# Patient Record
Sex: MALE | Race: BLACK OR AFRICAN AMERICAN | Employment: FULL TIME | URBAN - METROPOLITAN AREA
[De-identification: names, ages, dates, MRNs, and addresses within clinical notes are randomized per-mention and may not be internally consistent; named-entity substitution may affect disease eponyms.]

---

## 2022-10-30 ENCOUNTER — HOSPITAL ENCOUNTER (EMERGENCY)
Age: 32
Discharge: HOME | End: 2022-10-30

## 2022-10-30 VITALS
SYSTOLIC BLOOD PRESSURE: 126 MMHG | BODY MASS INDEX: 26.48 KG/M2 | RESPIRATION RATE: 17 BRPM | TEMPERATURE: 97.5 F | HEIGHT: 65 IN | WEIGHT: 158.95 LBS | DIASTOLIC BLOOD PRESSURE: 73 MMHG | HEART RATE: 64 BPM | OXYGEN SATURATION: 100 %

## 2022-10-30 DIAGNOSIS — R52 BODY ACHES: ICD-10-CM

## 2022-10-30 DIAGNOSIS — B34.9 VIRAL ILLNESS: Primary | ICD-10-CM

## 2022-10-30 DIAGNOSIS — Z20.822 LAB TEST NEGATIVE FOR COVID-19 VIRUS: ICD-10-CM

## 2022-10-30 DIAGNOSIS — Z02.89 ENCOUNTER TO OBTAIN EXCUSE FROM WORK: ICD-10-CM

## 2022-10-30 LAB
RAPID INFLUENZA  B AGN: NEGATIVE
RAPID INFLUENZA A AGN: NEGATIVE
SARS-COV-2, NAAT: NOT DETECTED

## 2022-10-30 PROCEDURE — 87804 INFLUENZA ASSAY W/OPTIC: CPT

## 2022-10-30 PROCEDURE — 99283 EMERGENCY DEPT VISIT LOW MDM: CPT

## 2022-10-30 PROCEDURE — 6370000000 HC RX 637 (ALT 250 FOR IP): Performed by: NURSE PRACTITIONER

## 2022-10-30 PROCEDURE — 87635 SARS-COV-2 COVID-19 AMP PRB: CPT

## 2022-10-30 RX ORDER — ACETAMINOPHEN 500 MG
1000 TABLET ORAL ONCE
Status: COMPLETED | OUTPATIENT
Start: 2022-10-30 | End: 2022-10-30

## 2022-10-30 RX ORDER — ACETAMINOPHEN 500 MG
500 TABLET ORAL 4 TIMES DAILY PRN
Qty: 28 TABLET | Refills: 0 | Status: SHIPPED | OUTPATIENT
Start: 2022-10-30 | End: 2022-11-06

## 2022-10-30 RX ORDER — IBUPROFEN 600 MG/1
600 TABLET ORAL 3 TIMES DAILY PRN
Qty: 15 TABLET | Refills: 0 | Status: SHIPPED | OUTPATIENT
Start: 2022-10-30 | End: 2022-11-04

## 2022-10-30 RX ADMIN — ACETAMINOPHEN 1000 MG: 500 TABLET ORAL at 14:45

## 2022-10-30 RX ADMIN — IBUPROFEN 600 MG: 200 TABLET, FILM COATED ORAL at 14:46

## 2022-10-30 ASSESSMENT — PAIN SCALES - GENERAL
PAINLEVEL_OUTOF10: 5
PAINLEVEL_OUTOF10: 5

## 2022-10-30 ASSESSMENT — PAIN - FUNCTIONAL ASSESSMENT: PAIN_FUNCTIONAL_ASSESSMENT: 0-10

## 2022-10-30 NOTE — ED NOTES
D/C: Order noted for d/c. Pt confirmed d/c paperwork  have correct name. Discharge and education instructions reviewed with patient. Teach-back successful. Pt verbalized understanding and signed d/c papers. Pt denied questions at this time. No acute distress noted. Patient instructed to follow-up as noted - return to emergency department if symptoms worsen. Patient verbalized understanding. Discharged per EDMD with discharge instructions. Pt discharged to private vehicle. Patient stable upon departure. Thanked patient for choosing [id number]  for care. Provider aware of patient pain at time of discharge.        Madelyn Zarate RN  10/30/22 9603

## 2022-10-30 NOTE — DISCHARGE INSTRUCTIONS
Medication as prescribed ensuring that you eat prior to taking the ibuprofen as this is an NSAID medication that can otherwise cause gastrointestinal bleeding/stomach ulcers if taken on empty stomach. Drink plenty of fluids. Get plenty of rest.    Return to work tomorrow.

## 2022-10-30 NOTE — Clinical Note
Jose C Scott was seen and treated in our emergency department on 10/30/2022. He may return to work on 10/31/2022. If you have any questions or concerns, please don't hesitate to call.       Reina Alatorre, CEASAR - CNP

## 2022-10-30 NOTE — Clinical Note
Antwan Hamilton was seen and treated in our emergency department on 10/30/2022. He may return to work on 10/31/2022. If you have any questions or concerns, please don't hesitate to call.       Chuck Leonard, CEASAR - CNP

## 2022-11-02 ASSESSMENT — ENCOUNTER SYMPTOMS
VOMITING: 0
ABDOMINAL PAIN: 0
COUGH: 0
EYE PAIN: 0
SHORTNESS OF BREATH: 0
BACK PAIN: 0
ANAL BLEEDING: 0
NAUSEA: 0
SORE THROAT: 0

## 2022-11-02 NOTE — ED PROVIDER NOTES
[address]  [address] [address]  Dept: [phone]  Loc: [address] ENCOUNTER    GUILLERMO. I have evaluated this patient. My supervising physician was available for consultation. CHIEF COMPLAINT    Chief Complaint   Patient presents with    Generalized Body Aches     Pt states he has body aches for a day. HPI    Shakeel Batista is a 28 y.o. nontoxic, well-appearing male who presents with concern for possible COVID-19 infection. Symptoms include generalized body \"aches\" rated severity of 5/10. Onset was 1 day ago. Denies nasal/chest congestion, cough, chest pain, nausea, vomiting, lightheadedness, dizziness, presyncope, shortness of breath, fever, chills, sweats, cough, change in ability to smell/taste, headache, sore throat, otalgia, neck pain/stiffness, rashes, or other symptoms/concerns. The duration has been constant since the onset. There are no alleviating factors. The patient has not been COVID-19 vaccinated and boosted. He did get a flu shot this year. He has not had recent sick contacts. REVIEW OF SYSTEMS    Review of Systems   Constitutional:  Negative for chills, diaphoresis, fatigue and fever. HENT:  Negative for congestion and sore throat. Eyes:  Negative for pain and visual disturbance. Respiratory:  Negative for cough and shortness of breath. Cardiovascular:  Negative for chest pain and leg swelling. Gastrointestinal:  Negative for abdominal pain, anal bleeding, nausea and vomiting. Genitourinary:  Negative for difficulty urinating, dysuria, frequency and urgency. Musculoskeletal:  Positive for myalgias. Negative for back pain, neck pain and neck stiffness. Skin:  Negative for rash and wound. Neurological:  Negative for dizziness and light-headedness. Hematological: Negative. Psychiatric/Behavioral: Negative.          PAST MEDICAL AND SURGICAL Addended by: CRISTIANA ROMERO on: 3/10/2017 12:10 PM     Modules accepted: Orders     HISTORY    No past medical history on file. No past surgical history on file. CURRENT MEDICATIONS  (may include discharge medications prescribed in the ED)  Current Outpatient Rx   Medication Sig Dispense Refill    ibuprofen (ADVIL;MOTRIN) 600 MG tablet Take 1 tablet by mouth 3 times daily as needed for Pain With meals 15 tablet 0    acetaminophen (TYLENOL) 500 MG tablet Take 1 tablet by mouth 4 times daily as needed for Pain 28 tablet 0       ALLERGIES    No Known Allergies    FAMILY AND SOCIAL HISTORY    No family history on file. Social History     Socioeconomic History    Marital status: Single   Tobacco Use    Smoking status: Never    Smokeless tobacco: Never   Substance and Sexual Activity    Alcohol use: No    Drug use: No       PHYSICAL EXAM    VITAL SIGNS: /73   Pulse 64   Temp 97.5 °F (36.4 °C) (Oral)   Resp 17   Ht 5' 5\" (1.651 m)   Wt 158 lb 15.2 oz (72.1 kg)   SpO2 100%   BMI 26.45 kg/m²   Constitutional:  Well developed, well nourished, no acute distress  Eyes: Sclera nonicteric, conjunctiva normal   Ears: external ears normal, TM's clear bilaterally  Throat/Face: Uvula is midline. No erythema. No exudate or edema of the throat, no trismus, moist mucus membranes  Neck: Supple, no enlarged tonsillar lymphadenopathy  Respiratory:  Lungs clear to auscultation bilaterally, no retractions, no wheezes, rales, or rhonchi. Speaking in complete sentences, O2 saturation 100 % on room air  Cardiovascular:  regular rate, regular rhythm  GI:  soft, yes ma'am nontender, nondistended  Neurologic:  Awake, alert and oriented, no slurred speech  Integument:  Skin is warm and dry, no rash    RADIOLOGY/PROCEDURES    No orders to display       ED [facility]    See chart for details of medications given.     Vitals:    10/30/22 1258   BP: 126/73   Pulse: 64   Resp: 17   Temp: 97.5 °F (36.4 °C)   TempSrc: Oral   SpO2: 100%   Weight: 158 lb 15.2 oz (72.1 kg)   Height: 5' 5\" (1.651 m) Differential diagnoses:  Coronavirus, Influenza, Meningitis, Group A strep, Airway Obstruction, Pneumonia, Hypoxemia, Dehydration, other. Patient presenting without fever and nontoxic in appearance. COVID and influenza rapid test negative. No x-ray was obtained as patient is saturating at 100% room air with clear lung sounds. At this time additional testing is not indicated. Likely etiology of patient's symptoms is a viral illness. The patient was counseled to closely monitor their symptoms, and to return immediately to the Emergency Department for any difficulty breathing, confusion, dizziness or passing out, vomiting, chest pain, high fevers, weakness, or any other new or concerning symptoms. There is no evidence of emergent medical condition at this time, and the patient will be discharged in good condition. The patient was instructed to follow up as an outpatient in 1-2 days. The patient was instructed to return to the ED immediately for any new or worsening symptoms. The patient verbalized understanding. FINAL IMPRESSION    1. Viral illness    2. Lab test negative for COVID-19 virus    3. Body aches    4.  Encounter to obtain excuse from work        PLAN  Outpatient medications, followup, and discharge instructions (see EMR)      (Please note that this note was completed with a voice recognition program.  Every attempt was made to edit the dictations, but inevitably there remain words that are mis-transcribed.)             CEASAR Rosario CNP  11/02/22 7203

## 2022-11-28 ENCOUNTER — HOSPITAL ENCOUNTER (EMERGENCY)
Age: 32
Discharge: HOME | End: 2022-11-28

## 2022-11-28 VITALS
RESPIRATION RATE: 16 BRPM | HEART RATE: 87 BPM | TEMPERATURE: 98.7 F | DIASTOLIC BLOOD PRESSURE: 82 MMHG | SYSTOLIC BLOOD PRESSURE: 118 MMHG | OXYGEN SATURATION: 100 %

## 2022-11-28 DIAGNOSIS — R31.9 HEMATURIA, UNSPECIFIED TYPE: Primary | ICD-10-CM

## 2022-11-28 DIAGNOSIS — R30.0 DYSURIA: ICD-10-CM

## 2022-11-28 LAB
BACTERIA: ABNORMAL /HPF
BILIRUBIN URINE: NEGATIVE
BLOOD, URINE: ABNORMAL
CLARITY: CLEAR
COLOR: YELLOW
EKG ATRIAL RATE: 53 BPM
EKG DIAGNOSIS: NORMAL
EKG P AXIS: 42 DEGREES
EKG P-R INTERVAL: 154 MS
EKG Q-T INTERVAL: 386 MS
EKG QRS DURATION: 84 MS
EKG QTC CALCULATION (BAZETT): 362 MS
EKG R AXIS: 48 DEGREES
EKG T AXIS: 20 DEGREES
EKG VENTRICULAR RATE: 53 BPM
EPITHELIAL CELLS, UA: 2 /HPF (ref 0–5)
GLUCOSE URINE: NEGATIVE MG/DL
HYALINE CASTS: 0 /LPF (ref 0–8)
KETONES, URINE: NEGATIVE MG/DL
LEUKOCYTE ESTERASE, URINE: ABNORMAL
MICROSCOPIC EXAMINATION: YES
NITRITE, URINE: NEGATIVE
PH UA: 6.5 (ref 5–8)
PROTEIN UA: NEGATIVE MG/DL
RBC UA: 73 /HPF (ref 0–4)
SPECIFIC GRAVITY UA: 1.02 (ref 1–1.03)
SPECIMEN TYPE: NORMAL
TRICHOMONAS VAGINALIS SCREEN: NEGATIVE
URINE REFLEX TO CULTURE: YES
URINE TYPE: ABNORMAL
UROBILINOGEN, URINE: 0.2 E.U./DL
WBC UA: 20 /HPF (ref 0–5)

## 2022-11-28 PROCEDURE — 87808 TRICHOMONAS ASSAY W/OPTIC: CPT

## 2022-11-28 PROCEDURE — 93005 ELECTROCARDIOGRAM TRACING: CPT | Performed by: PHYSICIAN ASSISTANT

## 2022-11-28 PROCEDURE — 87591 N.GONORRHOEAE DNA AMP PROB: CPT

## 2022-11-28 PROCEDURE — 99284 EMERGENCY DEPT VISIT MOD MDM: CPT

## 2022-11-28 PROCEDURE — 87086 URINE CULTURE/COLONY COUNT: CPT

## 2022-11-28 PROCEDURE — 87491 CHLMYD TRACH DNA AMP PROBE: CPT

## 2022-11-28 PROCEDURE — 81001 URINALYSIS AUTO W/SCOPE: CPT

## 2022-11-28 RX ORDER — CEFUROXIME AXETIL 500 MG/1
500 TABLET ORAL 2 TIMES DAILY
Qty: 14 TABLET | Refills: 0 | Status: SHIPPED | OUTPATIENT
Start: 2022-11-28 | End: 2022-12-05

## 2022-11-28 ASSESSMENT — PAIN - FUNCTIONAL ASSESSMENT
PAIN_FUNCTIONAL_ASSESSMENT: NONE - DENIES PAIN

## 2022-11-28 NOTE — Clinical Note
Nick De Dios was seen and treated in our emergency department on 11/28/2022. He may return to work on 11/29/2022. If you have any questions or concerns, please don't hesitate to call.       Lary Romano, [address]

## 2022-11-28 NOTE — ED PROVIDER NOTES
[facility]        Pt Name: Jamil Lara  MRN: [id number]  [place] 1990  Date of evaluation: 11/28/2022  Provider: DARON eDe  PCP: CEASAR Wild CNP  Note Started: 11:44 AM EST     The ED Attending Physician was available for consultation but did not see or evaluate this patient. CHIEF COMPLAINT       Chief Complaint   Patient presents with    Dysuria     Peeing blood, denies flank        HISTORY OF PRESENT ILLNESS   (Location, Timing/Onset, Context/Setting, Quality, Duration, Modifying Factors, Severity, Associated Signs and Symptoms)  Note limiting factors. Jamil Lara is a 28 y.o. male who presents reporting seeing red blood in his urine this morning. He says it was also painful to urinate. No penile discharge otherwise. Denies any testicular pain or swelling or any rash. Denies abdominal pain. Not sure whether or not to be suspicious for an STD. Denies any recent trauma or injury. No known chronic problems with the kidneys or bladder. Also reports that he is been having some pain in his chest, mostly in the middle, feels something like heartburn, been going on for weeks, but he is mostly been eating normally. Denies any difficulty breathing or cough. No known medical problems. Nursing Notes were all reviewed and agreed with or any disagreements were addressed in the HPI. REVIEW OF SYSTEMS    (2-9 systems for level 4, 10 or more for level 5)     Positives and pertinent negatives as per HPI. PAST MEDICAL HISTORY   History reviewed. No pertinent past medical history. SURGICAL HISTORY   History reviewed. No pertinent surgical history.     [address]       Discharge Medication List as of 11/28/2022  3:00 PM        CONTINUE these medications which have NOT CHANGED    Details   ibuprofen (ADVIL;MOTRIN) 600 MG tablet Take 1 tablet by mouth 3 times daily as needed for Pain With meals, Disp-15 tablet, R-0Print      acetaminophen (TYLENOL) 500 MG tablet Take 1 tablet by mouth 4 times daily as needed for Pain, Disp-28 tablet, R-0Print             ALLERGIES     Patient has no known allergies. FAMILYHISTORY     History reviewed. No pertinent family history. SOCIAL HISTORY       Social History     Tobacco Use    Smoking status: Never    Smokeless tobacco: Never   Substance Use Topics    Alcohol use: No    Drug use: No       SCREENINGS    Camacho Coma Scale  Eye Opening: Spontaneous  Best Verbal Response: Oriented  Best Motor Response: Obeys commands  [place]acho Coma Scale Score: 15      PHYSICAL EXAM    (up to 7 for level 4, 8 or more for level 5)     ED Triage Vitals [11/28/22 1130]   BP Temp Temp Source Heart Rate Resp SpO2 Height Weight   118/82 98.7 °F (37.1 °C) Oral 62 17 99 % -- --       Physical Exam  Vitals and nursing note reviewed. Constitutional:       General: He is not in acute distress. Appearance: Normal appearance. He is not ill-appearing. HENT:      Head: Normocephalic and atraumatic. Nose: Nose normal.   Eyes:      General:         Right eye: No discharge. Left eye: No discharge. Cardiovascular:      Rate and Rhythm: Normal rate and regular rhythm. Heart sounds: Normal heart sounds. Pulmonary:      Effort: Pulmonary effort is normal. No respiratory distress. Breath sounds: Normal breath sounds. No stridor. No wheezing, rhonchi or rales. Abdominal:      General: Bowel sounds are normal. There is no distension. Palpations: Abdomen is soft. There is no mass. Tenderness: There is no abdominal tenderness. There is no guarding or rebound. Musculoskeletal:         General: Normal range of motion. Cervical back: Normal range of motion. Skin:     General: Skin is warm and dry. Neurological:      General: No focal deficit present. Mental Status: He is alert and oriented to person, place, and time.    Psychiatric:         Mood and Affect: Mood normal.         Behavior: Behavior normal.       DIAGNOSTIC RESULTS   LABS:    Labs Reviewed   URINALYSIS WITH REFLEX TO CULTURE - Abnormal; Notable for the following components:       Result Value    Blood, Urine MODERATE (*)     Leukocyte Esterase, Urine TRACE (*)     All other components within normal limits   MICROSCOPIC URINALYSIS - Abnormal; Notable for the following components:    WBC, UA 20 (*)     RBC, UA 73 (*)     All other components within normal limits   C.TRACHOMATIS N.GONORRHOEAE DNA, URINE   CULTURE, URINE   TRICHOMONAS BY EIA       When ordered only abnormal lab results are displayed. All other labs were within normal range or not returned as of this dictation. EKG: When ordered, EKG's are interpreted by the Emergency Department Physician in the absence of a cardiologist.  Please see their note for interpretation of EKG. RADIOLOGY:   All images such as plain radiographs, CT, Ultrasound and MRI are interpreted by a radiologist. Some images are visualized and preliminarily interpreted by me and/or the ED attending physician. Interpretation per the radiologist below, if available at the time of this note:    No orders to display       CONSULTS:  None    PROCEDURES   Unless otherwise noted below, none. Procedures    EMERGENCY DEPARTMENT COURSE and DIFFERENTIAL DIAGNOSIS/MDM:   Vitals:    Vitals:    11/28/22 1130 11/28/22 1437   BP: 118/82    Pulse: 62 87   Resp: 17 16   Temp: 98.7 °F (37.1 °C)    TempSrc: Oral    SpO2: 99% 100%       Patient was given the following medications:  Medications - No data to display        Is this patient to be included in the SEP-1 Core Measure due to severe sepsis or septic shock? No   Exclusion criteria - the patient is NOT to be included for SEP-1 Core Measure due to:  2+ SIRS criteria are not met    Patient's urinalysis showed hematuria, and there was some white blood cells in the urine as well.  Given the lack of any other explanation for the patient's symptoms, he will be treated for infection with an antibiotic. Trichomonas test is negative. Chlamydia gonorrhea test results are pending and patient will be notified if they are positive, but suspicion is fairly low. He will be given referral for urology to follow-up with his if symptoms have not resolved in a week or so. The patient verbalized understanding and agreement with this plan of care. The patient was advised to return to the emergency department if symptoms should significantly worsen or if new and concerning symptoms should appear. I estimate there is LOW risk for ACUTE APPENDICITIS, BOWEL OBSTRUCTION, CHOLECYSTITIS, DIVERTICULITIS, INCARCERATED HERNIA, PANCREATITIS, PERFORATED BOWEL, BOWEL ISCHEMIA, PERITONITIS, SEPSIS, or CARDIAC ISCHEMIA, thus I consider the discharge disposition reasonable. CRITICAL CARE TIME   None    FINAL IMPRESSION      1. Hematuria, unspecified type    2.  Dysuria          DISPOSITION/PLAN   DISPOSITION Decision To Discharge 11/28/2022 02:50:51 PM      PATIENT REFERRED TO:  Marta Justice, [address] [facility] [address] [address]  [address] [facility]  [phone]    Call in 1 week  If no improvement in symptoms, for urology follow-up care    DISCHARGE MEDICATIONS:  Discharge Medication List as of 11/28/2022  3:00 PM        START taking these medications    Details   cefUROXime (CEFTIN) 500 MG tablet Take 1 tablet by mouth 2 times daily for 7 days, Disp-14 tablet, R-0Normal             DISCONTINUED MEDICATIONS:  Discharge Medication List as of 11/28/2022  3:00 PM               (Please note that portions of this note were completed with a voice recognition program.  Efforts were made to edit the dictations but occasionally words are mis-transcribed.)    DARON Miller (electronically signed)       Angela Miller  11/28/22 Rylee Brody

## 2022-11-28 NOTE — DISCHARGE INSTRUCTIONS
You will be notified by phone within 1 week if the results of your remaining tests are positive. If the results are negative, you will not be contacted. If you want to confirm the results of these tests anyway at least 4-5 days after your visit, register online for [address] ([email or web address]) to view the results, or go to [facility] [place] at [address] [place] [address] and ask for Medical Records. You must have picture I.D. to obtain results in person. No test results will be provided by phone.     FOR MORE INFORMATION ABOUT STDS, CONTACT:    [facility] Hospital[facility]od[facility] Woodard [address]  [phone]

## 2022-11-28 NOTE — Clinical Note
Lakhwinder White was seen and treated in our emergency department on 11/28/2022. He may return to work on 11/29/2022. If you have any questions or concerns, please don't hesitate to call.       Angela Moraes

## 2022-11-29 LAB — URINE CULTURE, ROUTINE: NORMAL

## 2022-11-30 LAB
C. TRACHOMATIS DNA ,URINE: POSITIVE
N. GONORRHOEAE DNA, URINE: NEGATIVE

## 2022-11-30 NOTE — RESULT ENCOUNTER NOTE
Patient was unable to be reached over the phone D/T non working number  Will initiate the certified letter process at this time.

## 2022-11-30 NOTE — RESULT ENCOUNTER NOTE
Culture reviewed, please contact patient and inform them of the results and call in a prescription for doxycycline 100 mg by mouth 2 times daily for 7 days.

## 2023-01-15 ENCOUNTER — HOSPITAL ENCOUNTER (EMERGENCY)
Age: 33
Discharge: HOME | End: 2023-01-15

## 2023-01-15 VITALS
HEART RATE: 67 BPM | SYSTOLIC BLOOD PRESSURE: 122 MMHG | TEMPERATURE: 97.7 F | OXYGEN SATURATION: 100 % | RESPIRATION RATE: 16 BRPM | DIASTOLIC BLOOD PRESSURE: 86 MMHG | BODY MASS INDEX: 26.19 KG/M2 | WEIGHT: 157.19 LBS | HEIGHT: 65 IN

## 2023-01-15 DIAGNOSIS — B34.9 VIRAL ILLNESS: Primary | ICD-10-CM

## 2023-01-15 LAB
RAPID INFLUENZA  B AGN: NEGATIVE
RAPID INFLUENZA A AGN: NEGATIVE
SARS-COV-2, NAAT: NOT DETECTED

## 2023-01-15 PROCEDURE — 87635 SARS-COV-2 COVID-19 AMP PRB: CPT

## 2023-01-15 PROCEDURE — 99283 EMERGENCY DEPT VISIT LOW MDM: CPT

## 2023-01-15 PROCEDURE — 87804 INFLUENZA ASSAY W/OPTIC: CPT

## 2023-01-15 ASSESSMENT — PAIN - FUNCTIONAL ASSESSMENT
PAIN_FUNCTIONAL_ASSESSMENT: 0-10
PAIN_FUNCTIONAL_ASSESSMENT: NONE - DENIES PAIN

## 2023-01-15 ASSESSMENT — PAIN SCALES - GENERAL: PAINLEVEL_OUTOF10: 0

## 2023-01-15 NOTE — DISCHARGE INSTRUCTIONS
Take Tylenol 650 mg as needed for headaches and body aches. If new or worsening symptoms follow-up with primary care doctor return to ED as needed. COVID-19 negative, influenza negative.

## 2023-01-15 NOTE — ED PROVIDER NOTES
[facility]        Pt Name: Asif Patel  MRN: [id number]  [place] 1990  Date of evaluation: 1/15/2023  Provider: Priscila Hester PA-C  PCP: No primary care provider on file. Note Started: 1:32 PM EST 1/15/23      GUILLERMO. I have evaluated this patient. My supervising physician was available for consultation. CHIEF COMPLAINT       Chief Complaint   Patient presents with    Illness     Pt states having body aches, chills, fatigue. Denies fever, cough, etc.        HISTORY OF PRESENT ILLNESS: 1 or more Elements     History From: Patient  Limitations to history : None    Asif Patel is a 28 y.o. male who presents to the emergency department by private vehicle complaining of body aches intermittent headache for the past couple days. [place] Patient wishes to be screened for COVID-19 and influenza as a precaution. Denies any definitive COVID-19 or influenza exposure. Denies any fevers, chills, rhinorrhea, congestion, cough, sore throat, chest pain, shortness breath, nausea, vomiting, abdominal pain. Feels well otherwise. No other complaints at this time. Nursing Notes were all reviewed and agreed with or any disagreements were addressed in the HPI. REVIEW OF SYSTEMS :      Review of Systems    Positives and Pertinent negatives as per HPI. SURGICAL HISTORY   History reviewed. No pertinent surgical history. Ginger Parnell       Discharge Medication List as of 1/15/2023  2:35 PM        CONTINUE these medications which have NOT CHANGED    Details   ibuprofen (ADVIL;MOTRIN) 600 MG tablet Take 1 tablet by mouth 3 times daily as needed for Pain With meals, Disp-15 tablet, R-0Print      acetaminophen (TYLENOL) 500 MG tablet Take 1 tablet by mouth 4 times daily as needed for Pain, Disp-28 tablet, R-0Print             ALLERGIES     Patient has no known allergies.     FAMILYHISTORY     History reviewed. No pertinent family history. SOCIAL HISTORY       Social History     Tobacco Use    Smoking status: Never    Smokeless tobacco: Never   Substance Use Topics    Alcohol use: No    Drug use: No       SCREENINGS        Camacho Coma Scale  Eye Opening: Spontaneous  Best Verbal Response: Oriented  Best Motor Response: Obeys commands  [place] Coma Scale Score: 15                CIWA Assessment  BP: 122/86  Heart Rate: 67           PHYSICAL EXAM  1 or more Elements     ED Triage Vitals [01/15/23 1331]   BP Temp Temp Source Heart Rate Resp SpO2 Height Weight   122/86 97.7 °F (36.5 °C) Oral 67 16 100 % 5' 5\" (1.651 m) 157 lb 3 oz (71.3 kg)       Physical Exam  Constitutional:       Appearance: Normal appearance. HENT:      Head: Normocephalic and atraumatic. Mouth/Throat:      Mouth: Mucous membranes are moist.      Pharynx: No oropharyngeal exudate or posterior oropharyngeal erythema. Eyes:      Pupils: Pupils are equal, round, and reactive to light. Cardiovascular:      Rate and Rhythm: Normal rate and regular rhythm. Pulmonary:      Effort: Pulmonary effort is normal. No respiratory distress. Breath sounds: Normal breath sounds. Musculoskeletal:      Cervical back: Normal range of motion and neck supple. Skin:     General: Skin is warm. Neurological:      General: No focal deficit present. Mental Status: He is alert and oriented to person, place, and time. Psychiatric:         Mood and Affect: Mood normal.         Behavior: Behavior normal.           DIAGNOSTIC RESULTS   LABS:    Labs Reviewed   COVID-19, RAPID   RAPID INFLUENZA A/B ANTIGENS       When ordered only abnormal lab results are displayed. All other labs were within normal range or not returned as of this dictation. EKG: When ordered, EKG's are interpreted by the Emergency Department Physician in the absence of a cardiologist.  Please see their note for interpretation of EKG.     RADIOLOGY:   Non-plain film images such as CT, Ultrasound and MRI are read by the radiologist. Plain radiographic images are visualized and preliminarily interpreted by the ED Provider with the below findings:        Interpretation per the Radiologist below, if available at the time of this note:    No orders to display     No results found.    No results found.    PROCEDURES   Unless otherwise noted below, none     Procedures    CRITICAL CARE TIME (.cctime)   CRITICAL CARE NOTE:  There was a high probability of clinically significant life-threatening deterioration of the patient's condition requiring my urgent intervention.    Total critical care time is 0 minutes.    This includes vital sign monitoring, pulse oximetry monitoring, telemetry monitoring, clinical response to the IV medications, reviewing the nursing notes, consultation time, dictation/documentation time, and interpretation of the labwork. This excludes any separately billable procedures performed.      PAST MEDICAL HISTORY      has no past medical history on file.     EMERGENCY DEPARTMENT COURSE and DIFFERENTIAL DIAGNOSIS/MDM:   Vitals:    Vitals:    01/15/23 1331   BP: 122/86   Pulse: 67   Resp: 16   Temp: 97.7 °F (36.5 °C)   TempSrc: Oral   SpO2: 100%   Weight: 157 lb 3 oz (71.3 kg)   Height: 5' 5\" (1.651 m)       Patient was given the following medications:  Medications - No data to display          Is this patient to be included in the SEP-1 Core Measure due to severe sepsis or septic shock?   No   Exclusion criteria - the patient is NOT to be included for SEP-1 Core Measure due to:  2+ SIRS criteria are not met    Chronic Conditions affecting care: none   has no past medical history on file.    CONSULTS: (Who and What was discussed)  None      Social Determinants : None    Records Reviewed (Source): none    CC/HPI Summary, DDx, ED Course, and Reassessment:     Patient presents ED with intermittent headaches and body aches for the past 4 days.  He is concerned for COVID-19 and  influenza, this prompted ED evaluation. COVID-19 negative, influenza negative. Vital signs normal.  Patient with no other symptoms this time. Suspect viral illness. No further emergent ED work-up or evaluation at this time. Patient comfortable plan. Discharged home in stable condition. The patient tolerated their visit well. I saw the patient independently with physician available for consultation as needed. I have discussed the findings of today's workup with the patient and addressed the patient's questions and concerns. Important warning signs as well as new or worsening symptoms which would necessitate immediate return to the ED were discussed. The plan is to discharge from the ED at this time, and the patient is in stable condition. The patient acknowledged understanding is agreeable with this plan. Disposition Considerations (tests considered but not done, Admit vs D/C, Shared Decision Making, Pt Expectation of Test or Tx.):  see above      I am the Primary Clinician of Record. FINAL IMPRESSION      1. Viral illness          DISPOSITION/PLAN     DISPOSITION Decision To Discharge 01/15/2023 02:14:09 PM      PATIENT REFERRED TO:  [facility] Emergency Department  [address] [place] [address]  [phone]  Go to   If symptoms worsen    CEASAR Vyas - CNP  [phone]    Call   For follow up and reevaluation.     DISCHARGE MEDICATIONS:  Discharge Medication List as of 1/15/2023  2:35 PM          DISCONTINUED MEDICATIONS:  Discharge Medication List as of 1/15/2023  2:35 PM                 (Please note that portions of this note were completed with a voice recognition program.  Efforts were made to edit the dictations but occasionally words are mis-transcribed.)    Les Chatman PA-C (electronically signed)           Les Chatman PA-C  01/15/23 FARZANA Villasenor  01/15/23 8007

## 2023-01-15 NOTE — Clinical Note
Lew Navarro was seen and treated in our emergency department on 1/15/2023. He may return to work on 01/16/2023. If you have any questions or concerns, please don't hesitate to call.       [address], [place]

## 2023-01-15 NOTE — ED NOTES
D/C: Order noted for d/c. Pt confirmed d/c paperwork have correct name. Discharge and education instructions reviewed with patient. Teach-back successful. Pt verbalized understanding and signed d/c papers. Pt denied questions at this time. No acute distress noted. Patient instructed to follow-up as noted - return to emergency department if symptoms worsen. Patient verbalized understanding. Discharged per EDMD with discharge instructions. Pt discharged to private vehicle. Patient stable upon departure. Thanked patient for choosing [facility]) for care. Provider aware of patient pain at time of discharge.        Katty Daugherty, RN  01/15/23 [id number]